# Patient Record
Sex: MALE | Race: OTHER | HISPANIC OR LATINO | URBAN - METROPOLITAN AREA
[De-identification: names, ages, dates, MRNs, and addresses within clinical notes are randomized per-mention and may not be internally consistent; named-entity substitution may affect disease eponyms.]

---

## 2018-09-02 ENCOUNTER — EMERGENCY (EMERGENCY)
Facility: HOSPITAL | Age: 43
LOS: 1 days | Discharge: HOME | End: 2018-09-02
Attending: EMERGENCY MEDICINE
Payer: COMMERCIAL

## 2018-09-02 VITALS
DIASTOLIC BLOOD PRESSURE: 76 MMHG | OXYGEN SATURATION: 97 % | SYSTOLIC BLOOD PRESSURE: 112 MMHG | TEMPERATURE: 98 F | HEART RATE: 83 BPM

## 2018-09-02 VITALS — HEIGHT: 68 IN | WEIGHT: 154.98 LBS

## 2018-09-02 PROCEDURE — T1013: CPT

## 2018-09-02 PROCEDURE — 12011 RPR F/E/E/N/L/M 2.5 CM/<: CPT

## 2018-09-02 PROCEDURE — 99283 EMERGENCY DEPT VISIT LOW MDM: CPT | Mod: 25

## 2018-09-02 PROCEDURE — 99282 EMERGENCY DEPT VISIT SF MDM: CPT | Mod: 25

## 2018-09-02 NOTE — ED PROCEDURE NOTE - ATTENDING CONTRIBUTION TO CARE
Verbalized Understanding/Moderate: Comprehensive teaching
I was present for and supervised the key portions of the procedure.

## 2018-09-02 NOTE — ED PROVIDER NOTE - OBJECTIVE STATEMENT
44 yo M presented to ED for laceration repair. Pt states that he was pulling up wood and a piece of wood hit him in the face. Pt denies any LOC. No HA or faical pain. dT UTD. Bleeding controlled

## 2018-09-02 NOTE — ED PROVIDER NOTE - ATTENDING CONTRIBUTION TO CARE
I, Nithin Watson, performed a face to face bedside interview with this patient regarding history of present illness, review of symptoms and relevant past medical, social and family history.  I completed an independent physical examination. I have communicated the patient’s plan of care and disposition with the ACP.  43 year old male presents with facial lac after being hit in face by piece of wood. No LOC, no HA  Gen: NAD, well appearing  ENT: no tenderness to orbit/skull, 2 cm vertical lac above left eye  CV: RRR  Pul: CTA b/l  Neuro: no focal deficits  lac repaired, neuro intact, no HA/LOC, stable for dc

## 2018-09-09 ENCOUNTER — EMERGENCY (EMERGENCY)
Facility: HOSPITAL | Age: 43
LOS: 1 days | Discharge: HOME | End: 2018-09-09
Attending: EMERGENCY MEDICINE
Payer: COMMERCIAL

## 2018-09-09 VITALS — WEIGHT: 149.91 LBS | HEIGHT: 62 IN

## 2018-09-09 VITALS
SYSTOLIC BLOOD PRESSURE: 114 MMHG | DIASTOLIC BLOOD PRESSURE: 69 MMHG | RESPIRATION RATE: 18 BRPM | OXYGEN SATURATION: 100 % | TEMPERATURE: 98 F | HEART RATE: 69 BPM

## 2018-09-09 PROCEDURE — G0463: CPT

## 2018-09-09 NOTE — ED ADULT TRIAGE NOTE - NS ED NURSE DIRECT TO ROOM YN
Pt c/o left hip pain radiating to knee x3-4wks without injury. Pt reports difficulty walking due to pain  
Yes

## 2023-06-28 NOTE — ED ADULT TRIAGE NOTE - CHIEF COMPLAINT QUOTE
pt with lt eyebrow laceration  from fixing floor. Pt is a 34 yo M, single, disabled, non-caregiver, resides at a [facility], with psych h/o moderate ID, ASD, impulse control disorder, factitious disorder, HAVEN, mood disorders, PTSD and ADHD, multiple past psych admissions (last known [facility] 6/13/23-present), numerous ED visits for both medical/psych complaints, longstanding h/o SIB (head banging, cutting), but no known SA, longstanding h/o endorsing SI, h/o aggression toward staff at , PMH significant for asthma, DM, urinary retention, GERD, BPH, hypothyroidism, HLD, HTN, and b/l myopia, who was admitted to [facility]  for self inflicted laceration to abdomen, endorsing CAH to hurt himself and staff at group home. Pt transferred to [facility] and admitted to medicine on 6/23/23 for lethargy, pneumonia. Psychiatry consulted for further management of SI, mood sx.    On initial assessment, pt is calm and cooperative, no rigidity or other signs of catatonia. Pt reflects on recent SA with self-inflicted laceration to the abdomen in the setting of reported alcohol use and CAH. Pt notes that since admission to [facility], his AH has since resolved and denies any ongoing SI. Pt hopeful to be medically cleared soon with return back to [facility] so that he could be discharged to rehab. No other concerns at this time. At this time, pt's sx likely 2/2 underlying mood disorder and continues to require ongoing psychiatric care.     6/26: minimally engaged, struggles to wake for interview. denies SI. in fair behavioral control. No PRNs overnight. c/w current regimen    6/27: more engaged today. does not want to return to inpatient psych. suicidal behavior discussed; patient does not recognize severity of behavior or need for further psychiatric treatment. requires transfer back to [facility]. 2pc signed.     6/28: unchanged, return to [facility] when bed available     Recommendations-  --> Continue 1:1 at bedside due to recent SI  -->Continue current standing medications as ordered on [facility]: Depakote 1500mg qhs, Intuniv ER 4mg qhs (may need to be ordered as non-formulary), risperidone 4 mg BID, Remeron 30 mg qhs, klonopin 1 mg daily, Gabapentin 400 mg TID.  --> PRNs: Thorazine 50 mg PO/IM q6h, Ativan 2 mg PO/IM/IV q6h for severe agitation (monitor qtc < 500). Pt with documented allergy on [place] to Haldol and Zyprexa (though have not verified with pt yet)  -->once medically cleared, transfer back to [facility]  --> psych will continue to follow   Pt is a 34 yo M, single, disabled, non-caregiver, resides at a [facility], with psych h/o moderate ID, ASD, impulse control disorder, factitious disorder, HAVEN, mood disorders, PTSD and ADHD, multiple past psych admissions (last known [facility] 6/13/23-present), numerous ED visits for both medical/psych complaints, longstanding h/o SIB (head banging, cutting), but no known SA, longstanding h/o endorsing SI, h/o aggression toward staff at , PMH significant for asthma, DM, urinary retention, GERD, BPH, hypothyroidism, HLD, HTN, and b/l myopia, who was admitted to [facility]  for self inflicted laceration to abdomen, endorsing CAH to hurt himself and staff at group home. Pt transferred to [facility] and admitted to medicine on 6/23/23 for lethargy, pneumonia. Psychiatry consulted for further management of SI, mood sx.    On initial assessment, pt is calm and cooperative, no rigidity or other signs of catatonia. Pt reflects on recent SA with self-inflicted laceration to the abdomen in the setting of reported alcohol use and CAH. Pt notes that since admission to [facility], his AH has since resolved and denies any ongoing SI. Pt hopeful to be medically cleared soon with return back to [facility] so that he could be discharged to rehab. No other concerns at this time. At this time, pt's sx likely 2/2 underlying mood disorder and continues to require ongoing psychiatric care.     6/26: minimally engaged, struggles to wake for interview. denies SI. in fair behavioral control. No PRNs overnight. c/w current regimen    Recommendations-  --> Continue 1:1 at bedside due to recent SI  -->Continue current standing medications as ordered on [facility]: Depakote 1500mg qhs, Intuniv ER 4mg qhs (may need to be ordered as non-formulary), risperidone 4 mg BID, Remeron 30 mg qhs, klonopin 1 mg daily, Gabapentin 400 mg TID.  --> PRNs: Thorazine 50 mg PO/IM q6h, Ativan 2 mg PO/IM/IV q6h for severe agitation (monitor qtc < 500). Pt with documented allergy on [place] to Haldol and Zyprexa (though have not verified with pt yet)  -->once medically cleared, transfer back to [facility]  --> psych will continue to follow   Pt is a 34 yo M, single, disabled, non-caregiver, resides at a [facility], with psych h/o moderate ID, ASD, impulse control disorder, factitious disorder, HAVEN, mood disorders, PTSD and ADHD, multiple past psych admissions (last known [facility] 6/13/23-present), numerous ED visits for both medical/psych complaints, longstanding h/o SIB (head banging, cutting), but no known SA, longstanding h/o endorsing SI, h/o aggression toward staff at , PMH significant for asthma, DM, urinary retention, GERD, BPH, hypothyroidism, HLD, HTN, and b/l myopia, who was admitted to [place] [address]  for self inflicted laceration to abdomen, endorsing CAH to hurt himself and staff at group home. Pt transferred to [facility] and admitted to medicine on 6/23/23 for lethargy, pneumonia. Psychiatry consulted for further management of SI, mood sx.    On initial assessment, pt is calm and cooperative, no rigidity or other signs of catatonia. Pt reflects on recent SA with self-inflicted laceration to the abdomen in the setting of reported alcohol use and CAH. Pt notes that since admission to [facility], his AH has since resolved and denies any ongoing SI. Pt hopeful to be medically cleared soon with return back to [facility] so that he could be discharged to rehab. No other concerns at this time. At this time, pt's sx likely 2/2 underlying mood disorder and continues to require ongoing psychiatric care.     6/26: minimally engaged, struggles to wake for interview. denies SI. in fair behavioral control. No PRNs overnight. c/w current regimen    6/27: more engaged today. does not want to return to inpatient psych. suicidal behavior discussed; patient does not recognize severity of behavior or need for further psychiatric treatment. requires transfer back to [facility]. 2pc signed.     Recommendations-  --> Continue 1:1 at bedside due to recent SI  -->Continue current standing medications as ordered on [place] [address]: Depakote 1500mg qhs, Intuniv ER 4mg qhs (may need to be ordered as non-formulary), risperidone 4 mg BID, Remeron 30 mg qhs, klonopin 1 mg daily, Gabapentin 400 mg TID.  --> PRNs: Thorazine 50 mg PO/IM q6h, Ativan 2 mg PO/IM/IV q6h for severe agitation (monitor qtc < 500). Pt with documented allergy on [place] to Haldol and Zyprexa (though have not verified with pt yet)  -->once medically cleared, transfer back to [facility]  --> psych will continue to follow